# Patient Record
Sex: FEMALE | Race: BLACK OR AFRICAN AMERICAN | NOT HISPANIC OR LATINO | Employment: FULL TIME | URBAN - METROPOLITAN AREA
[De-identification: names, ages, dates, MRNs, and addresses within clinical notes are randomized per-mention and may not be internally consistent; named-entity substitution may affect disease eponyms.]

---

## 2023-08-31 LAB
CHLAMYDIA TRACH., AMPLIFIED: NEGATIVE
N. GONORRHEA, AMPLIFIED: NEGATIVE
TRICHOMONAS VAGINALIS: NEGATIVE

## 2024-01-30 ENCOUNTER — HOSPITAL ENCOUNTER (OUTPATIENT)
Dept: RADIOLOGY | Facility: CLINIC | Age: 44
Discharge: HOME | End: 2024-01-30
Payer: MEDICAID

## 2024-01-30 ENCOUNTER — OFFICE VISIT (OUTPATIENT)
Dept: PODIATRY | Facility: CLINIC | Age: 44
End: 2024-01-30
Payer: MEDICAID

## 2024-01-30 DIAGNOSIS — M79.672 PAIN IN BOTH FEET: ICD-10-CM

## 2024-01-30 DIAGNOSIS — M76.71 PERONEAL TENDINITIS OF BOTH LOWER LEGS: ICD-10-CM

## 2024-01-30 DIAGNOSIS — M79.671 PAIN IN BOTH FEET: ICD-10-CM

## 2024-01-30 DIAGNOSIS — M79.671 PAIN IN BOTH FEET: Primary | ICD-10-CM

## 2024-01-30 DIAGNOSIS — B35.1 ONYCHOMYCOSIS: ICD-10-CM

## 2024-01-30 DIAGNOSIS — M79.672 PAIN IN BOTH FEET: Primary | ICD-10-CM

## 2024-01-30 DIAGNOSIS — R26.89 ANTALGIC GAIT: ICD-10-CM

## 2024-01-30 DIAGNOSIS — M76.72 PERONEAL TENDINITIS OF BOTH LOWER LEGS: ICD-10-CM

## 2024-01-30 PROCEDURE — 11755 BIOPSY NAIL UNIT: CPT | Performed by: PODIATRIST

## 2024-01-30 PROCEDURE — 87102 FUNGUS ISOLATION CULTURE: CPT | Mod: OUT | Performed by: PODIATRIST

## 2024-01-30 PROCEDURE — 73630 X-RAY EXAM OF FOOT: CPT | Mod: 50

## 2024-01-30 PROCEDURE — 73630 X-RAY EXAM OF FOOT: CPT | Mod: BILATERAL PROCEDURE

## 2024-01-30 PROCEDURE — 99204 OFFICE O/P NEW MOD 45 MIN: CPT | Performed by: PODIATRIST

## 2024-01-30 PROCEDURE — 87206 SMEAR FLUORESCENT/ACID STAI: CPT

## 2024-01-30 PROCEDURE — 87102 FUNGUS ISOLATION CULTURE: CPT

## 2024-01-30 RX ORDER — ALBUTEROL SULFATE 90 UG/1
AEROSOL, METERED RESPIRATORY (INHALATION)
COMMUNITY

## 2024-01-30 RX ORDER — NORETHINDRONE ACETATE AND ETHINYL ESTRADIOL, AND FERROUS FUMARATE 1.5-30(21)
1 KIT ORAL DAILY
COMMUNITY
Start: 2023-02-26

## 2024-01-30 ASSESSMENT — ENCOUNTER SYMPTOMS
ENDOCRINE NEGATIVE: 1
PSYCHIATRIC NEGATIVE: 1
GASTROINTESTINAL NEGATIVE: 1
ROS SKIN COMMENTS: NAIL CHANGES
ALLERGIC/IMMUNOLOGIC NEGATIVE: 1
CONSTITUTIONAL NEGATIVE: 1
ARTHRALGIAS: 1
EYES NEGATIVE: 1
HEMATOLOGIC/LYMPHATIC NEGATIVE: 1
RESPIRATORY NEGATIVE: 1
CARDIOVASCULAR NEGATIVE: 1

## 2024-01-30 NOTE — PROGRESS NOTES
Chief Complaint   Patient presents with    Foot Pain     5th met area JULIO CÉSAR    New Patient is here today for pain and burning of the 5th met area JULIO CÉSAR. Left worse than right foot.     -No recent xrays. No injury or trama to the areas.  Wears crocs and tennis shoes hurt feet worse.  Taking OTC Motrin to help with pain.    Pain lateral feet > 6 months. Began on the right foot and then left foot. Points to the 5th met base. Sometimes motrin helps. No h/o trauma.  Although admits yesterday twisted the left foot on the sidewalk.  Has some initial burning discomfort that subsided.  But prior to that no known injuries.  Occasional post attic dyskinesia.   Not unsteady on feet.  Also thickened toenails to right foot.  And painful hyperkeratosis IP joint right great toe.    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       Review of Systems   Constitutional: Negative.    HENT: Negative.     Eyes: Negative.    Respiratory: Negative.     Cardiovascular: Negative.    Gastrointestinal: Negative.    Endocrine: Negative.    Genitourinary: Negative.    Musculoskeletal:  Positive for arthralgias and gait problem.   Skin:         Nail changes     Allergic/Immunologic: Negative.    Hematological: Negative.    Psychiatric/Behavioral: Negative.         General/Constitutional: Alert. NAD.   Respiratory: Non labored breathing.   Psychiatric: Mood and affect normal/baseline.   HEENT: Sclera clear. Wearing corrective lenses.  Dermatologic: Second digit nail right foot is hypertrophic.  Painful.  Other nails are normal.  Web spaces are dry. No ulcers no pre-ulcerative areas.  Mild hyperkeratosis IP joint right great toe.  Vascular: Pedal pulses are intact and symmetric including the dorsalis pedis and the posterior tibial pulses. Feet are warm to touch. No swelling appreciated either extremity.  Neurological: Alert and oriented. No acute distress. No sensory impairment bilateral.  Musculoskeletal: Motor function is grossly  intact and symmetric except for the left brevis there is pain with resistance.  Focal pain on palpation of the fifth metatarsal base left foot.  No acute swelling noted.  Distal insertional pain of the peroneal tendon.  Right foot minimal discomfort of the fifth metatarsal base.  Pes planus deformity.  Hypermobility noted.    Impression: Peroneal tendinitis left foot.  Bilateral foot pain.  Onychomycosis.  Hyperkeratosis.    -Today's treatment and course of therapy was discussed with the patient in detail. Patient's questions were answered. Proper foot care was discussed. This dictation was done using Dragon computer software and as such may contain grammatical errors.    -At this time recommend wearing supportive shoe would be ideal.  I would avoid crocs or slip on shoes as there is no much support at all.    -Will get x-rays of both feet.  Will discuss in follow-up    -Culture taken second digit nail plate right foot.  This will be back in 5 weeks.    -Superficial debridement of the IP joint hyperkeratosis.  You can use a pumice stone on your own as this will need to be repeated    -Smoking cessation discussed.     -Recommend new balance athletic shoe.  Appropriate prescription given for this.  Can consider making custom inserts.

## 2024-02-06 ENCOUNTER — LAB REQUISITION (OUTPATIENT)
Dept: LAB | Facility: HOSPITAL | Age: 44
End: 2024-02-06
Payer: MEDICAID

## 2024-02-06 DIAGNOSIS — B35.1 TINEA UNGUIUM: ICD-10-CM

## 2024-02-13 ENCOUNTER — APPOINTMENT (OUTPATIENT)
Dept: PODIATRY | Facility: CLINIC | Age: 44
End: 2024-02-13
Payer: MEDICAID

## 2024-03-05 LAB
CALCOFLUOR WHITE SPEC: NORMAL
FUNGUS SPEC CULT: NORMAL

## 2024-03-12 ENCOUNTER — OFFICE VISIT (OUTPATIENT)
Dept: PODIATRY | Facility: CLINIC | Age: 44
End: 2024-03-12
Payer: MEDICAID

## 2024-03-12 DIAGNOSIS — M79.672 PAIN IN BOTH FEET: ICD-10-CM

## 2024-03-12 DIAGNOSIS — B35.1 ONYCHOMYCOSIS: ICD-10-CM

## 2024-03-12 DIAGNOSIS — R26.89 ANTALGIC GAIT: ICD-10-CM

## 2024-03-12 DIAGNOSIS — M72.2 PLANTAR FASCIITIS: ICD-10-CM

## 2024-03-12 DIAGNOSIS — M76.72 PERONEAL TENDINITIS OF BOTH LOWER LEGS: Primary | ICD-10-CM

## 2024-03-12 DIAGNOSIS — M76.71 PERONEAL TENDINITIS OF BOTH LOWER LEGS: Primary | ICD-10-CM

## 2024-03-12 DIAGNOSIS — M79.671 PAIN IN BOTH FEET: ICD-10-CM

## 2024-03-12 PROCEDURE — 20605 DRAIN/INJ JOINT/BURSA W/O US: CPT | Performed by: PODIATRIST

## 2024-03-12 PROCEDURE — 99214 OFFICE O/P EST MOD 30 MIN: CPT | Performed by: PODIATRIST

## 2024-03-12 PROCEDURE — 1036F TOBACCO NON-USER: CPT | Performed by: PODIATRIST

## 2024-03-12 RX ORDER — METHYLPREDNISOLONE 4 MG/1
4 TABLET ORAL DAILY
Qty: 7 TABLET | Refills: 0 | Status: SHIPPED | OUTPATIENT
Start: 2024-03-12 | End: 2024-03-19

## 2024-03-12 RX ORDER — TRIAMCINOLONE ACETONIDE 40 MG/ML
40 INJECTION, SUSPENSION INTRA-ARTICULAR; INTRAMUSCULAR ONCE
Status: COMPLETED | OUTPATIENT
Start: 2024-03-12 | End: 2024-03-12

## 2024-03-12 RX ADMIN — TRIAMCINOLONE ACETONIDE 40 MG: 40 INJECTION, SUSPENSION INTRA-ARTICULAR; INTRAMUSCULAR at 11:19

## 2024-03-12 ASSESSMENT — ENCOUNTER SYMPTOMS
PSYCHIATRIC NEGATIVE: 1
EYES NEGATIVE: 1
CONSTITUTIONAL NEGATIVE: 1
CARDIOVASCULAR NEGATIVE: 1
ARTHRALGIAS: 1
ALLERGIC/IMMUNOLOGIC NEGATIVE: 1
ROS SKIN COMMENTS: NAIL CHANGES
GASTROINTESTINAL NEGATIVE: 1
ENDOCRINE NEGATIVE: 1
RESPIRATORY NEGATIVE: 1
HEMATOLOGIC/LYMPHATIC NEGATIVE: 1

## 2024-03-12 NOTE — PROGRESS NOTES
Chief Complaint   Patient presents with    Follow-up     Patient is here today for a follow up on nail culture and xray   Follow-up bilateral foot pain though primarily left foot.  Describes pain diffusely throughout the foot including plantar fascia and intermetatarsal spaces and lateral fifth metatarsal.  Duration about 6 months.  No trauma.  The shoes that she is wearing are quite old.  Continues with dystrophic second digit nail.  Had culture last visit.  And also painful hyperkeratosis IP joint left great toe.    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       Review of Systems   Constitutional: Negative.    HENT: Negative.     Eyes: Negative.    Respiratory: Negative.     Cardiovascular: Negative.    Gastrointestinal: Negative.    Endocrine: Negative.    Genitourinary: Negative.    Musculoskeletal:  Positive for arthralgias and gait problem.   Skin:         Nail changes     Allergic/Immunologic: Negative.    Hematological: Negative.    Psychiatric/Behavioral: Negative.         General/Constitutional: Alert. NAD.   Respiratory: Non labored breathing.   Psychiatric: Mood and affect normal/baseline.   HEENT: Sclera clear. Wearing corrective lenses.  Dermatologic: Painful second digit right foot nail plate.  Nail plate is dystrophic.  Nail culture no growth.  Web spaces are dry. No ulcers no pre-ulcerative areas.  Mild hyperkeratosis IP joint right great toe.  This is baseline.  Vascular: Pedal pulses are intact and symmetric including the dorsalis pedis and the posterior tibial pulses. Feet are warm to touch. No swelling appreciated either extremity.  Neurological: Alert and oriented. No acute distress. No sensory impairment bilateral.  Musculoskeletal: Still some diffuse pain on palpation throughout the left foot.  This includes the peroneal insertion area.  Intermetatarsal space especially #2.  Some plantar fascial pain as well.  Motor function is grossly intact and symmetric except for the  left brevis there is pain with resistance.  Focal pain on palpation of the fifth metatarsal base left foot.  No acute swelling noted.  Distal insertional pain of the peroneal tendon.  Right foot minimal discomfort of the fifth metatarsal base.  Pes planus deformity.   hypermobility noted.  Considerable genu valgum.    Impression: Peroneal tendinitis left foot.  Fasciitis.  Bilateral foot pain.  Onychomycosis dystrophic nail.  Hyperkeratosis.  Pes planus.    -Today's treatment and course of therapy was discussed with the patient in detail. Patient's questions were answered. Proper foot care was discussed. This dictation was done using Dragon computer software and as such may contain grammatical errors.    -Review x-rays in detail.    -Offering a sterile injection Kenalog 40 mg divided between the second and third metatarsal space of the left foot.  And the peroneal brevis insertion base of fifth metatarsal left foot.    -Debridement nail to right and IP joint hyperkeratosis.    -Discussed more supportive shoe gear.  Over-the-counter insert such as from a running shoe store or [employer] shoes.    -Can consider making custom orthotics.  Get prior authorization from insurance company.    -Will prescribe Medrol Dosepak see how you do postinjection if you need to take it later this week please get the prescription filled.    -Superficial debridement of the IP joint hyperkeratosis.  You can use a pumice stone on your own as this will need to be repeated    -Smoking cessation discussed.     -Physical therapy.

## 2024-04-16 ENCOUNTER — OFFICE VISIT (OUTPATIENT)
Dept: PODIATRY | Facility: CLINIC | Age: 44
End: 2024-04-16
Payer: MEDICAID

## 2024-04-16 DIAGNOSIS — M72.2 PLANTAR FASCIITIS: ICD-10-CM

## 2024-04-16 DIAGNOSIS — R26.89 ANTALGIC GAIT: ICD-10-CM

## 2024-04-16 DIAGNOSIS — M76.71 PERONEAL TENDINITIS OF BOTH LOWER LEGS: Primary | ICD-10-CM

## 2024-04-16 DIAGNOSIS — M76.72 PERONEAL TENDINITIS OF BOTH LOWER LEGS: Primary | ICD-10-CM

## 2024-04-16 DIAGNOSIS — B35.1 ONYCHOMYCOSIS: ICD-10-CM

## 2024-04-16 DIAGNOSIS — M79.671 PAIN IN BOTH FEET: ICD-10-CM

## 2024-04-16 DIAGNOSIS — M79.672 PAIN IN BOTH FEET: ICD-10-CM

## 2024-04-16 PROCEDURE — 99214 OFFICE O/P EST MOD 30 MIN: CPT | Performed by: PODIATRIST

## 2024-04-16 PROCEDURE — L4360 PNEUMAT WALKING BOOT PRE CST: HCPCS | Performed by: PODIATRIST

## 2024-04-16 PROCEDURE — 1036F TOBACCO NON-USER: CPT | Performed by: PODIATRIST

## 2024-04-16 RX ORDER — METHYLPREDNISOLONE 4 MG/1
TABLET ORAL
COMMUNITY
Start: 2024-03-22

## 2024-04-16 ASSESSMENT — ENCOUNTER SYMPTOMS
RESPIRATORY NEGATIVE: 1
ARTHRALGIAS: 1
PSYCHIATRIC NEGATIVE: 1
CONSTITUTIONAL NEGATIVE: 1
CARDIOVASCULAR NEGATIVE: 1
ROS SKIN COMMENTS: NAIL CHANGES
EYES NEGATIVE: 1
GASTROINTESTINAL NEGATIVE: 1
ENDOCRINE NEGATIVE: 1
ALLERGIC/IMMUNOLOGIC NEGATIVE: 1
HEMATOLOGIC/LYMPHATIC NEGATIVE: 1

## 2024-04-16 NOTE — PROGRESS NOTES
Chief Complaint   Patient presents with    Follow-up     Patient is here today for a follow up on left foot injection. Injection and Medrol dose pack not helpful        FU injection and medrol.  Good initial improvement.  However pain now is about the same now. Most pain lateral left foot and heel.  Forefoot pain is diminished.  IP joint hyperkeratotic area right great toe continues painful.  Second digit dystrophic and discomfort.  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       Review of Systems   Constitutional: Negative.    HENT: Negative.     Eyes: Negative.    Respiratory: Negative.     Cardiovascular: Negative.    Gastrointestinal: Negative.    Endocrine: Negative.    Genitourinary: Negative.    Musculoskeletal:  Positive for arthralgias and gait problem.   Skin:         Nail changes     Allergic/Immunologic: Negative.    Hematological: Negative.    Psychiatric/Behavioral: Negative.         General/Constitutional: Alert. NAD.   Respiratory: Non labored breathing.   Psychiatric: Mood and affect normal/baseline.   HEENT: Sclera clear. Wearing corrective lenses.  Dermatologic:  Painful second digit right foot nail plate.  Nail plate is dystrophic.  Nail culture no growth.  Web spaces are dry. No ulcers no pre-ulcerative areas.  Mild hyperkeratosis IP joint right great toe.  This is painful  Vascular: Pedal pulses are intact and symmetric including the dorsalis pedis and the posterior tibial pulses. Feet are warm to touch. No swelling appreciated either extremity.  Neurological: Alert and oriented. No acute distress. No sensory impairment bilateral.  Musculoskeletal: Continues with focal pain on palpation fifth metatarsal base peroneal insertion.  Pain with eversion of the foot.  Minimal soft tissue swelling noted.  Forefoot pain at this time is resolved.  Partial healing evident.  Considerable genu valgum.    Impression: Peroneal tendinitis left foot.  Fasciitis.  Bilateral foot pain.   Onychomycosis dystrophic nail.  Hyperkeratosis.  Pes planus.    -Today's treatment and course of therapy was discussed with the patient in detail. Patient's questions were answered. Proper foot care was discussed. This dictation was done using Dragon computer software and as such may contain grammatical errors.    -Still recommend more supportive shoe gear.  Over-the-counter insert such as from a running shoe store or [employer] shoes.    -Check with insurance regarding custom orthotic.  Will set up for casting appointment    -Dispensed cam boot.  Caution with driving and ambulating.    -Superficial debridement of the IP joint hyperkeratosis.  You can use a pumice stone on your own as this will need to be repeated    -Smoking cessation discussed.     -Rx Physical therapy.

## 2024-05-28 ENCOUNTER — APPOINTMENT (OUTPATIENT)
Dept: PODIATRY | Facility: CLINIC | Age: 44
End: 2024-05-28
Payer: MEDICAID

## 2024-06-24 ENCOUNTER — EVALUATION (OUTPATIENT)
Dept: PHYSICAL THERAPY | Facility: CLINIC | Age: 44
End: 2024-06-24
Payer: MEDICAID

## 2024-06-24 DIAGNOSIS — M76.71 PERONEAL TENDINITIS OF BOTH LOWER LEGS: ICD-10-CM

## 2024-06-24 DIAGNOSIS — M76.72 PERONEAL TENDINITIS OF BOTH LOWER LEGS: ICD-10-CM

## 2024-06-24 PROCEDURE — 97161 PT EVAL LOW COMPLEX 20 MIN: CPT | Mod: GP | Performed by: PHYSICAL THERAPIST

## 2024-06-24 PROCEDURE — 97110 THERAPEUTIC EXERCISES: CPT | Mod: GP | Performed by: PHYSICAL THERAPIST

## 2024-06-24 ASSESSMENT — ENCOUNTER SYMPTOMS
DEPRESSION: 0
OCCASIONAL FEELINGS OF UNSTEADINESS: 0
LOSS OF SENSATION IN FEET: 1

## 2024-06-24 NOTE — PROGRESS NOTES
Physical Therapy    Physical Therapy Evaluation and Treatment      Patient Name: Yessica Melgar  MRN: [id number]  Today's Date: 6/24/2024  Time Calculation  Start Time: 0150  Stop Time: 0230  Time Calculation (min): 40 minVisit: 1  Insurance: Reviewed  Physician: Chela Saravia  PT Evaluation Time Entry  PT Evaluation (Low) Time Entry: 30  PT Therapeutic Procedures Time Entry  Therapeutic Exercise Time Entry: 10    Assessment: Patient seen in PT for Initial Evaluation for foot and ankle pain secondary to PTT.   Patient presents with postural deviation  decreased ankle ROM , decreased ankle strength, foot and ankle  tenderness, steps one at a time going down.  Functionally, patient  unable to walk and stand prolonged secondary to pain.  Patient rates LEFS at 45%.    PT Assessment  Rehab Prognosis: Good  Evaluation/Treatment Tolerance: Patient tolerated treatment well     Plan:  Continue with POC  General fitness, shoe and orthotic wear, calf stretch, ankle strength, CKC, proprioception    OP PT Plan  PT Plan: Skilled PT  PT Frequency: 1 time per week  Duration: 6  Onset Date: 04/16/24  Rehab Potential: Good  Plan of Care Agreement: Patient    Current Problem:   1. Peroneal tendinitis of both lower legs  Referral to Physical Therapy    Follow Up In Physical Therapy          Subjective    General: Patient with a history of bilateral foot and ankle pain  for 8.  Onset was insidious - started in the right, now worse in left.  Patient treated with cortisone shot and boot - left foot and ankle.  Patient complains of pain in the region of the lateral foot -lateral ankle -bottom of heel, sharp pain, 7/10 at worst last 7 days.  Patient's pain is worse with standing and walking.  Works as a  - but has to do a lot of walking at times.   Functionally, patient is able to do all ADL's with pain.          Precautions: asthma, fall risk      Prior Level of Function: able to walk and stand prolonged       Objective      Posture: valgus and pronation  LE ROM:  bilateral ankle DF 0, PF 55, IN 35, EV 25  LE strength: left ankle 4/5 dorsiflexion, 4/5 plantarflexion, 4/5 IN, and 4/5 EV   Right ankle strength 4+/5  Gait: Trunk list to side with ipsilateral stance  Balance: sls 10 sec +  Tender to palpation at the lateral foot right, plantar fashia     Outcome Measures:  Other Measures  Lower Extremity Funtional Score (LEFS): 45%     Treatments:  Patient instructed in HEP including: ankle DF/PF and IN/EV ROM 20X, alphabet 1X, ankle DF with strap 10X and resisted ankle DF and IN with yellow theraband 15X (10 minutes)     EDUCATION: HEP       Goals:  Active       PT Problem       PT Goal 1       Start:  06/24/24    Expected End:  09/22/24       Ankle/foot pain 4/10 or less         PT Goal 2       Start:  06/24/24    Expected End:  09/22/24       Improve LEFS by 20%         PT Goal 3       Start:  06/24/24    Expected End:  09/22/24       DF ROM to 10         PT Goal 4       Start:  06/24/24    Expected End:  09/22/24       Ankle strength 5/5

## 2024-06-25 ENCOUNTER — APPOINTMENT (OUTPATIENT)
Dept: PODIATRY | Facility: CLINIC | Age: 44
End: 2024-06-25
Payer: MEDICAID

## 2024-07-12 DIAGNOSIS — J45.909 UNCOMPLICATED ASTHMA, UNSPECIFIED ASTHMA SEVERITY, UNSPECIFIED WHETHER PERSISTENT (HHS-HCC): Primary | ICD-10-CM

## 2024-07-12 DIAGNOSIS — Z30.41 ENCOUNTER FOR SURVEILLANCE OF CONTRACEPTIVE PILLS: ICD-10-CM

## 2024-07-12 RX ORDER — NORETHINDRONE ACETATE AND ETHINYL ESTRADIOL, AND FERROUS FUMARATE 1.5-30(21)
1 KIT ORAL DAILY
Qty: 90 TABLET | Refills: 3 | Status: SHIPPED | OUTPATIENT
Start: 2024-07-12

## 2024-07-12 RX ORDER — ALBUTEROL SULFATE 90 UG/1
2 AEROSOL, METERED RESPIRATORY (INHALATION)
Qty: 18 G | Refills: 2 | Status: SHIPPED | OUTPATIENT
Start: 2024-07-12

## 2024-07-23 ENCOUNTER — TREATMENT (OUTPATIENT)
Dept: PHYSICAL THERAPY | Facility: CLINIC | Age: 44
End: 2024-07-23
Payer: MEDICAID

## 2024-07-23 DIAGNOSIS — M76.71 PERONEAL TENDINITIS OF BOTH LOWER LEGS: ICD-10-CM

## 2024-07-23 DIAGNOSIS — M76.72 PERONEAL TENDINITIS OF BOTH LOWER LEGS: ICD-10-CM

## 2024-07-23 PROCEDURE — 97110 THERAPEUTIC EXERCISES: CPT | Mod: GP | Performed by: PHYSICAL THERAPIST

## 2024-07-23 NOTE — PROGRESS NOTES
Physical Therapy    Physical Therapy Treatment    Patient Name: Yessica Melgar  MRN: [id number]  Today's Date: 7/23/2024  Visit: 2/6  Insurance: [facility]  Authorization: 6/24/24-9/22/24       Time Entry:   Time Calculation  Start Time: 1100  Stop Time: 1140  Time Calculation (min): 40 min     PT Therapeutic Procedures Time Entry  Therapeutic Exercise Time Entry: 40                   Assessment: Good understanding and compliance with HEP.      Plan:  Continue with POC       Current Problem  1. Peroneal tendinitis of both lower legs  Follow Up In Physical Therapy          General      Foot pain yesterday -   Worse after sit to stand after prolonged sitting  Pain to 10/10 at worst last 2 days      Subjective              Objective     Independent with HEP    Treatments:  SciFit stepper 7 minutes  Step stretch 20X  SLS 2 sec each 10X  Heel raises 20X  Practiced heel to toe walking 20' X 4  Reviewed HEP  Mobilization of foot and ankle all planes 10 minutes  (40 minutes)      OP EDUCATION: HEP       Goals:  Active       PT Problem       PT Goal 1       Start:  06/24/24    Expected End:  09/22/24       Ankle/foot pain 4/10 or less         PT Goal 2       Start:  06/24/24    Expected End:  09/22/24       Improve LEFS by 20%         PT Goal 3       Start:  06/24/24    Expected End:  09/22/24       DF ROM to 10         PT Goal 4       Start:  06/24/24    Expected End:  09/22/24       Ankle strength 5/5

## 2024-07-30 ENCOUNTER — TREATMENT (OUTPATIENT)
Dept: PHYSICAL THERAPY | Facility: CLINIC | Age: 44
End: 2024-07-30
Payer: MEDICAID

## 2024-07-30 DIAGNOSIS — M76.72 PERONEAL TENDINITIS OF BOTH LOWER LEGS: ICD-10-CM

## 2024-07-30 DIAGNOSIS — M76.71 PERONEAL TENDINITIS OF BOTH LOWER LEGS: ICD-10-CM

## 2024-07-30 PROCEDURE — 97110 THERAPEUTIC EXERCISES: CPT | Mod: GP,CQ

## 2024-07-30 ASSESSMENT — PAIN - FUNCTIONAL ASSESSMENT: PAIN_FUNCTIONAL_ASSESSMENT: 0-10

## 2024-07-30 ASSESSMENT — PAIN SCALES - GENERAL: PAINLEVEL_OUTOF10: 3

## 2024-07-30 NOTE — PROGRESS NOTES
Physical Therapy    Physical Therapy Treatment    Patient Name: Yessica Melgar  MRN: [id number]  Today's Date: 7/30/2024  Visit: 3/6  Insurance: [facility]  Authorization: 6/24/24-9/22/24   Time Entry:   Time Calculation  Start Time: 1030  Stop Time: 1115  Time Calculation (min): 45 min     PT Therapeutic Procedures Time Entry  Therapeutic Exercise Time Entry: 45                   Assessment:   PT Assessment  Evaluation/Treatment Tolerance: Patient tolerated treatment well  Treatment session received well. Addition of resisted ankle strengthening, moderate muscular shaking present bilaterally through both inversion and eversion; review of exercises for HEP. Expressed tolerable discomfort 2nd to intensity of stretching with added calf/soleus stretch. Finishes session feeling well at this time.     Plan:  Continue with POC     Current Problem  1. Peroneal tendinitis of both lower legs  Follow Up In Physical Therapy        General        Subjective    Pain is not that bad right now , 3/10, it was feeling good and pain free this morning until I put my shoes on  Pain is intermittent through the week, will perform my exercises and reduce the current symptoms  HEP completed at least twice a day    Pain Assessment  Pain Assessment: 0-10  0-10 (Numeric) Pain Score: 3    Objective   Independent with HEP    Treatments:  SciFit bike x 7 minutes  Step stretch 20X  Calf/soleus stretch on wedge 2 x 20 sec each  Seated self inv/supination peroneal stretch 2 x 20 sec  GTB resisted Inv/ever/df x 20 each  SLS 5 sec each 10X  Heel toe walking x 3 in // bars  Heel raises 20X no UE support  Foam standing mini squat x 10 (discharge to other balance activities, knee pain)    NT  Mobilization of foot and ankle all planes 10 minutes    (45 minutes)    OP EDUCATION: HEP     Goals:  Active       PT Problem       PT Goal 1       Start:  06/24/24    Expected End:  09/22/24       Ankle/foot pain 4/10 or less         PT Goal 2       Start:  06/24/24     Expected End:  09/22/24       Improve LEFS by 20%         PT Goal 3       Start:  06/24/24    Expected End:  09/22/24       DF ROM to 10         PT Goal 4       Start:  06/24/24    Expected End:  09/22/24       Ankle strength 5/5

## 2024-08-06 ENCOUNTER — APPOINTMENT (OUTPATIENT)
Dept: PHYSICAL THERAPY | Facility: CLINIC | Age: 44
End: 2024-08-06
Payer: MEDICAID

## 2024-08-13 ENCOUNTER — TREATMENT (OUTPATIENT)
Dept: PHYSICAL THERAPY | Facility: CLINIC | Age: 44
End: 2024-08-13
Payer: MEDICAID

## 2024-08-13 DIAGNOSIS — M76.71 PERONEAL TENDINITIS OF BOTH LOWER LEGS: ICD-10-CM

## 2024-08-13 DIAGNOSIS — M76.72 PERONEAL TENDINITIS OF BOTH LOWER LEGS: ICD-10-CM

## 2024-08-13 PROCEDURE — 97110 THERAPEUTIC EXERCISES: CPT | Mod: GP | Performed by: PHYSICAL THERAPIST

## 2024-08-13 PROCEDURE — 97140 MANUAL THERAPY 1/> REGIONS: CPT | Mod: GP | Performed by: PHYSICAL THERAPIST

## 2024-08-13 NOTE — PROGRESS NOTES
Physical Therapy    Physical Therapy Treatment    Patient Name: Yessica Melgar  MRN: [id number]  Today's Date: 8/13/2024  Visit: 4/6  Insurance: [facility]  Authorization: 6/24/24-9/22/24   Time Entry:   Time Calculation  Start Time: 1100  Stop Time: 1145  Time Calculation (min): 45 min     PT Therapeutic Procedures Time Entry  Manual Therapy Time Entry: 10  Therapeutic Exercise Time Entry: 30  PT Modalities Time Entry  Hot/Cold Pack Time Entry: 5                Assessment: Patient with increased ankle pain last 2 days.  Patient advised to use ice, OTC meds and rest when painful.  Less pain after ice.       Plan:  Continue with POC     Current Problem  1. Peroneal tendinitis of both lower legs  Follow Up In Physical Therapy        General        Subjective   7/10 - pain when she wakes in the am - worse last 2 days  No aggravating factor            Objective       Treatments:  SciFit bike x 8 minutes  Step stretch 20X  Heel  raises 20X   Mini squats 20X  Practiced heel to toe walking 20' X 2  Seated self inv/supination peroneal stretch 2 x 20 sec  GTB resisted Inv/ever/df x 20 each  (30 minutes)  SLS 5 sec each 10X - not today    Mobilization of foot and ankle all planes 10 minutes    CP to the left ankle 5 minutes    (45 minutes)    OP EDUCATION: HEP     Goals:  Active       PT Problem       PT Goal 1       Start:  06/24/24    Expected End:  09/22/24       Ankle/foot pain 4/10 or less         PT Goal 2       Start:  06/24/24    Expected End:  09/22/24       Improve LEFS by 20%         PT Goal 3       Start:  06/24/24    Expected End:  09/22/24       DF ROM to 10         PT Goal 4       Start:  06/24/24    Expected End:  09/22/24       Ankle strength 5/5

## 2024-08-20 ENCOUNTER — APPOINTMENT (OUTPATIENT)
Dept: PHYSICAL THERAPY | Facility: CLINIC | Age: 44
End: 2024-08-20
Payer: MEDICAID

## 2024-09-03 ENCOUNTER — APPOINTMENT (OUTPATIENT)
Dept: OBSTETRICS AND GYNECOLOGY | Facility: CLINIC | Age: 44
End: 2024-09-03
Payer: MEDICAID

## 2024-09-03 ENCOUNTER — LAB (OUTPATIENT)
Dept: LAB | Facility: LAB | Age: 44
End: 2024-09-03
Payer: MEDICAID

## 2024-09-03 VITALS
WEIGHT: 275 LBS | DIASTOLIC BLOOD PRESSURE: 93 MMHG | SYSTOLIC BLOOD PRESSURE: 145 MMHG | HEIGHT: 62 IN | BODY MASS INDEX: 50.61 KG/M2

## 2024-09-03 DIAGNOSIS — Z11.3 SCREENING EXAMINATION FOR STI: ICD-10-CM

## 2024-09-03 DIAGNOSIS — Z01.419 ENCOUNTER FOR GYNECOLOGICAL EXAMINATION WITHOUT ABNORMAL FINDING: Primary | ICD-10-CM

## 2024-09-03 DIAGNOSIS — J45.909 UNCOMPLICATED ASTHMA, UNSPECIFIED ASTHMA SEVERITY, UNSPECIFIED WHETHER PERSISTENT (HHS-HCC): ICD-10-CM

## 2024-09-03 DIAGNOSIS — Z30.41 ENCOUNTER FOR SURVEILLANCE OF CONTRACEPTIVE PILLS: ICD-10-CM

## 2024-09-03 DIAGNOSIS — N94.6 DYSMENORRHEA: ICD-10-CM

## 2024-09-03 LAB
HCV AB SER QL: NONREACTIVE
HIV 1+2 AB+HIV1 P24 AG SERPL QL IA: NONREACTIVE
TREPONEMA PALLIDUM IGG+IGM AB [PRESENCE] IN SERUM OR PLASMA BY IMMUNOASSAY: NONREACTIVE

## 2024-09-03 PROCEDURE — 87591 N.GONORRHOEAE DNA AMP PROB: CPT

## 2024-09-03 PROCEDURE — 87491 CHLMYD TRACH DNA AMP PROBE: CPT

## 2024-09-03 PROCEDURE — 3008F BODY MASS INDEX DOCD: CPT | Performed by: OBSTETRICS & GYNECOLOGY

## 2024-09-03 PROCEDURE — 87661 TRICHOMONAS VAGINALIS AMPLIF: CPT

## 2024-09-03 PROCEDURE — 86803 HEPATITIS C AB TEST: CPT

## 2024-09-03 PROCEDURE — 86780 TREPONEMA PALLIDUM: CPT

## 2024-09-03 PROCEDURE — 99396 PREV VISIT EST AGE 40-64: CPT | Performed by: OBSTETRICS & GYNECOLOGY

## 2024-09-03 PROCEDURE — 36415 COLL VENOUS BLD VENIPUNCTURE: CPT

## 2024-09-03 PROCEDURE — 87389 HIV-1 AG W/HIV-1&-2 AB AG IA: CPT

## 2024-09-03 RX ORDER — ALBUTEROL SULFATE 90 UG/1
2 INHALANT RESPIRATORY (INHALATION)
Qty: 18 G | Refills: 2 | Status: SHIPPED | OUTPATIENT
Start: 2024-09-03

## 2024-09-03 RX ORDER — IBUPROFEN 800 MG/1
800 TABLET ORAL EVERY 8 HOURS PRN
Qty: 60 TABLET | Refills: 3 | Status: SHIPPED | OUTPATIENT
Start: 2024-09-03

## 2024-09-03 RX ORDER — NORETHINDRONE ACETATE AND ETHINYL ESTRADIOL, AND FERROUS FUMARATE 1.5-30(21)
1 KIT ORAL DAILY
Qty: 90 TABLET | Refills: 3 | Status: SHIPPED | OUTPATIENT
Start: 2024-09-03

## 2024-09-03 RX ORDER — NORETHINDRONE ACETATE AND ETHINYL ESTRADIOL 1.5; .03 MG/1; MG/1
1 TABLET ORAL DAILY
COMMUNITY
Start: 2024-04-20 | End: 2024-09-03 | Stop reason: WASHOUT

## 2024-09-03 RX ORDER — IBUPROFEN 800 MG/1
1 TABLET ORAL EVERY 8 HOURS PRN
COMMUNITY
Start: 2024-07-16 | End: 2024-09-03 | Stop reason: SDUPTHER

## 2024-09-03 NOTE — PROGRESS NOTES
Subjective   Yessica Melgar is a 43 y.o. female here for a routine exam.  She is on birth control pills.  Her cycles are typically regular and light.  She may occasionally cramping for 1 day.    No dysuria, no change in bowel habits or vaginal discharge.    We discussed that her oldest daughter is 21 years old, and her youngest son is now in first grade.    Personal health questionnaire reviewed: yes.     Gynecologic History  Patient's last menstrual period was 2024 (approximate).  Contraception: OCP (estrogen/progesterone)  Last Pap: 23. Results were: normal  Last mammogram: 22. Results were: normal    Obstetric History  OB History    Para Term  AB Living   2 2           SAB IAB Ectopic Multiple Live Births                  # Outcome Date GA Lbr William/2nd Weight Sex Type Anes PTL Lv   2 Para            1 Para                Objective   Constitutional: Alert and in no acute distress. Well developed, well nourished.   Head and Face: Head and face: Normal.    Eyes: Normal external exam - nonicteric sclera, extraocular movements intact (EOMI) and no ptosis.   Neck: No neck asymmetry. Supple. Thyroid not enlarged and there were no palpable thyroid nodules.    Pulmonary: No respiratory distress.   Chest: Breasts: Normal appearance, no nipple discharge and no skin changes. Palpation of breasts and axillae: No palpable mass and no axillary lymphadenopathy.   Abdomen: Soft nontender; no abdominal mass palpated. No organomegaly. No hernias.   Genitourinary: External genitalia: Normal. No inguinal lymphadenopathy. Bartholin's Urethral and Skenes Glands: Normal. Urethra: Normal.  Bladder: Normal on palpation. Vagina: Normal. Cervix: Normal.  Uterus: Normal.  Right Adnexa/parametria: Normal.  Left Adnexa/parametria: Normal.  Inspection of Perianal Area: Normal.   Musculoskeletal: No joint swelling seen, normal movements of all extremities.   Skin: Normal skin color and pigmentation, normal skin  turgor, and no rash.   Neurologic: Non-focal. Grossly intact.   Psychiatric: Alert and oriented x 3. Affect normal to patient baseline. Mood: Appropriate.  Physical Exam     Assessment/Plan   Healthy female exam.  This is a 43-year-old female with a normal exam.  No Pap smear was sent, she is high risk HPV negative in 2023.    Cultures were sent for chlamydia, gonorrhea and trichomonas.  We discussed also checking HIV, hepatitis C and syphilis.    She requests a refill of the Junel 1.5/30 d.a.w.  She does request a refill of her albuterol inhaler and ibuprofen.    Her routine mammogram was ordered with tomosynthesis.    I will see her in 1 year.  Mammogram ordered.

## 2024-09-04 LAB
C TRACH RRNA SPEC QL NAA+PROBE: NEGATIVE
N GONORRHOEA DNA SPEC QL PROBE+SIG AMP: NEGATIVE
T VAGINALIS RRNA SPEC QL NAA+PROBE: NEGATIVE

## 2024-09-10 ENCOUNTER — APPOINTMENT (OUTPATIENT)
Dept: PHYSICAL THERAPY | Facility: CLINIC | Age: 44
End: 2024-09-10
Payer: MEDICAID

## 2024-09-17 ENCOUNTER — TREATMENT (OUTPATIENT)
Dept: PHYSICAL THERAPY | Facility: CLINIC | Age: 44
End: 2024-09-17
Payer: MEDICAID

## 2024-09-17 DIAGNOSIS — M76.71 PERONEAL TENDINITIS OF BOTH LOWER LEGS: ICD-10-CM

## 2024-09-17 DIAGNOSIS — M76.72 PERONEAL TENDINITIS OF BOTH LOWER LEGS: ICD-10-CM

## 2024-09-17 PROCEDURE — 97110 THERAPEUTIC EXERCISES: CPT | Mod: GP | Performed by: PHYSICAL THERAPIST

## 2024-09-17 NOTE — PROGRESS NOTES
Physical Therapy    Physical Therapy Treatment    Patient Name: Yessica Melgar  MRN: [id number]  Today's Date: 9/17/2024  Visit: 5/6  Insurance: [facility]  Authorization: 6/24/24-9/22/24   Time Entry:   Time Calculation  Start Time: 1055  Stop Time: 1145  Time Calculation (min): 50 min     PT Therapeutic Procedures Time Entry  Therapeutic Exercise Time Entry: 40  PT Modalities Time Entry  Hot/Cold Pack Time Entry: 10                Assessment:  Decreased pain since last visit.  Able to relieve pain with CP.  Still continues to have high pain levels after being up on      Plan:  Continue with POC  Reassess next visit      Current Problem  1. Peroneal tendinitis of both lower legs  Follow Up In Physical Therapy          General        Subjective   10/10 - pain when she wakes in the am - worse last 2 days  Pain bad on Sunday - worse with standing and walking    Objective     Tender along border of 5th metatarsal on left foot    Treatments:  SciFit bike x 5 minutes  Step stretch 20X  Heel  raises 20X   Mini squats 20X  Practiced heel to toe walking 20' X 2  Wobble board DF/PF and IN/EV 30X each  Seated self inv/supination peroneal stretch with strap  20X  GTB resisted Inv/ever/df x 20 each  (40 minutes)      CP to the left ankle 10 minutes    (45 minutes)    OP EDUCATION: HEP     Goals:  Active       PT Problem       PT Goal 1       Start:  06/24/24    Expected End:  09/22/24       Ankle/foot pain 4/10 or less         PT Goal 2       Start:  06/24/24    Expected End:  09/22/24       Improve LEFS by 20%         PT Goal 3       Start:  06/24/24    Expected End:  09/22/24       DF ROM to 10         PT Goal 4       Start:  06/24/24    Expected End:  09/22/24       Ankle strength 5/5

## 2024-09-26 ENCOUNTER — APPOINTMENT (OUTPATIENT)
Dept: PRIMARY CARE | Facility: CLINIC | Age: 44
End: 2024-09-26
Payer: MEDICAID

## 2024-09-26 VITALS
BODY MASS INDEX: 49.38 KG/M2 | WEIGHT: 270 LBS | DIASTOLIC BLOOD PRESSURE: 86 MMHG | HEART RATE: 80 BPM | SYSTOLIC BLOOD PRESSURE: 128 MMHG | TEMPERATURE: 96.8 F

## 2024-09-26 DIAGNOSIS — Z12.11 COLON CANCER SCREENING: ICD-10-CM

## 2024-09-26 DIAGNOSIS — Z00.00 HEALTH CARE MAINTENANCE: Primary | ICD-10-CM

## 2024-09-26 DIAGNOSIS — G89.29 CHRONIC PAIN IN LEFT FOOT: ICD-10-CM

## 2024-09-26 DIAGNOSIS — Z13.1 DIABETES MELLITUS SCREENING: ICD-10-CM

## 2024-09-26 DIAGNOSIS — M79.672 CHRONIC PAIN IN LEFT FOOT: ICD-10-CM

## 2024-09-26 DIAGNOSIS — Z12.31 VISIT FOR SCREENING MAMMOGRAM: ICD-10-CM

## 2024-09-26 DIAGNOSIS — Z13.220 SCREENING CHOLESTEROL LEVEL: ICD-10-CM

## 2024-09-26 DIAGNOSIS — Z23 IMMUNIZATION DUE: ICD-10-CM

## 2024-09-26 PROCEDURE — 1036F TOBACCO NON-USER: CPT | Performed by: FAMILY MEDICINE

## 2024-09-26 PROCEDURE — 99386 PREV VISIT NEW AGE 40-64: CPT | Performed by: FAMILY MEDICINE

## 2024-09-26 RX ORDER — NAPROXEN 500 MG/1
500 TABLET ORAL 2 TIMES DAILY PRN
Qty: 60 TABLET | Refills: 5 | Status: SHIPPED | OUTPATIENT
Start: 2024-09-26 | End: 2025-05-24

## 2024-09-26 ASSESSMENT — PAIN SCALES - GENERAL: PAINLEVEL: 8

## 2024-09-26 NOTE — ASSESSMENT & PLAN NOTE
-CT scan of the foot ordered.   -Follow-up visit with podiatrist as scheduled.    -Naproxen 500 mg BID for 2 weeks then PRN.

## 2024-09-26 NOTE — ASSESSMENT & PLAN NOTE
-mammogram 9/6/2022.  Ordered.   -colonoscopy never- ordered.   -blood test last in 2018.  Ordered.   -pap smear 8/29/2023.   -eye exam was several years ago.  Recommended.   -immunizations all recommended.  To get at her local pharmacy.

## 2024-09-26 NOTE — PROGRESS NOTES
Subjective   Patient ID: Yessica Melgar is a 43 y.o. female who presents for Annual Exam (CPE  left foot ).    HPI    The patient is a 42 yo AA female with a history of obesity ( BMI: 49.3), here for:    1- CPE.  -mammogram 9/6/2022.  Ordered.   -colonoscopy never- ordered.   -blood test last in 2018.  Ordered.   -pap smear 8/29/2023.   -eye exam was several years ago.  Recommended.   -immunizations all recommended.  To get at her local pharmacy.      2- Left lateral foot dorsum pain.  Under the care of a podiatrist,.  It started over 6 months ago and is not getting better despite NSAIDS, PT and a boot.    A review of system was completed.  All systems were reviewed and were normal, except for the ones that are listed in the HPI.    Objective   Physical Exam  Constitutional:       Appearance: Normal appearance.   HENT:      Head: Normocephalic and atraumatic.      Right Ear: Tympanic membrane, ear canal and external ear normal.      Left Ear: Tympanic membrane, ear canal and external ear normal.      Nose: Nose normal.      Mouth/Throat:      Mouth: Mucous membranes are moist.      Pharynx: Oropharynx is clear.   Eyes:      Extraocular Movements: Extraocular movements intact.      Conjunctiva/sclera: Conjunctivae normal.      Pupils: Pupils are equal, round, and reactive to light.   Cardiovascular:      Rate and Rhythm: Normal rate and regular rhythm.      Pulses: Normal pulses.   Pulmonary:      Effort: Pulmonary effort is normal.      Breath sounds: Normal breath sounds.   Abdominal:      General: Abdomen is flat. Bowel sounds are normal.      Palpations: Abdomen is soft.   Musculoskeletal:         General: Normal range of motion.      Cervical back: Normal range of motion and neck supple.   Skin:     General: Skin is warm.   Neurological:      General: No focal deficit present.      Mental Status: She is alert and oriented to person, place, and time. Mental status is at baseline.   Psychiatric:         Mood  and Affect: Mood normal.         Behavior: Behavior normal.         Thought Content: Thought content normal.         Judgment: Judgment normal.     Assessment/Plan   Problem List Items Addressed This Visit       Health care maintenance - Primary     -mammogram 9/6/2022.  Ordered.   -colonoscopy never- ordered.   -blood test last in 2018.  Ordered.   -pap smear 8/29/2023.   -eye exam was several years ago.  Recommended.   -immunizations all recommended.  To get at her local pharmacy.          Relevant Orders    CT foot left wo IV contrast    Hemoglobin A1C    Lipid Panel    Comprehensive Metabolic Panel    CBC    TSH with reflex to Free T4 if abnormal    BI mammo bilateral screening tomosynthesis    Colonoscopy Screening; Average Risk Patient    Chronic pain in left foot     -CT scan of the foot ordered.   -Follow-up visit with podiatrist as scheduled.    -Naproxen 500 mg BID for 2 weeks then PRN.          Relevant Medications    naproxen (Naprosyn) 500 mg tablet    Other Relevant Orders    CT foot left wo IV contrast    Screening cholesterol level    Relevant Orders    Lipid Panel    Diabetes mellitus screening    Relevant Orders    Hemoglobin A1C    Visit for screening mammogram    Relevant Orders    BI mammo bilateral screening tomosynthesis    Colon cancer screening    Relevant Orders    Colonoscopy Screening; Average Risk Patient    Immunization due    Patient to return to office in 1 year for your next CPE.

## 2024-10-01 ENCOUNTER — LAB (OUTPATIENT)
Dept: LAB | Facility: LAB | Age: 44
End: 2024-10-01
Payer: MEDICAID

## 2024-10-01 DIAGNOSIS — Z13.1 DIABETES MELLITUS SCREENING: ICD-10-CM

## 2024-10-01 DIAGNOSIS — Z13.220 SCREENING CHOLESTEROL LEVEL: ICD-10-CM

## 2024-10-01 DIAGNOSIS — Z00.00 HEALTH CARE MAINTENANCE: ICD-10-CM

## 2024-10-01 LAB
ALBUMIN SERPL BCP-MCNC: 3.8 G/DL (ref 3.4–5)
ALP SERPL-CCNC: 73 U/L (ref 33–110)
ALT SERPL W P-5'-P-CCNC: 6 U/L (ref 7–45)
ANION GAP SERPL CALC-SCNC: 12 MMOL/L (ref 10–20)
AST SERPL W P-5'-P-CCNC: 10 U/L (ref 9–39)
BILIRUB SERPL-MCNC: 0.7 MG/DL (ref 0–1.2)
BUN SERPL-MCNC: 8 MG/DL (ref 6–23)
CALCIUM SERPL-MCNC: 8.9 MG/DL (ref 8.6–10.6)
CHLORIDE SERPL-SCNC: 107 MMOL/L (ref 98–107)
CHOLEST SERPL-MCNC: 136 MG/DL (ref 0–199)
CHOLESTEROL/HDL RATIO: 2.8
CO2 SERPL-SCNC: 25 MMOL/L (ref 21–32)
CREAT SERPL-MCNC: 0.68 MG/DL (ref 0.5–1.05)
EGFRCR SERPLBLD CKD-EPI 2021: >90 ML/MIN/1.73M*2
ERYTHROCYTE [DISTWIDTH] IN BLOOD BY AUTOMATED COUNT: 13.5 % (ref 11.5–14.5)
EST. AVERAGE GLUCOSE BLD GHB EST-MCNC: 120 MG/DL
GLUCOSE SERPL-MCNC: 123 MG/DL (ref 74–99)
HBA1C MFR BLD: 5.8 %
HCT VFR BLD AUTO: 38.4 % (ref 36–46)
HDLC SERPL-MCNC: 48.6 MG/DL
HGB BLD-MCNC: 12 G/DL (ref 12–16)
LDLC SERPL CALC-MCNC: 61 MG/DL
MCH RBC QN AUTO: 30 PG (ref 26–34)
MCHC RBC AUTO-ENTMCNC: 31.3 G/DL (ref 32–36)
MCV RBC AUTO: 96 FL (ref 80–100)
NON HDL CHOLESTEROL: 87 MG/DL (ref 0–149)
NRBC BLD-RTO: 0 /100 WBCS (ref 0–0)
PLATELET # BLD AUTO: 303 X10*3/UL (ref 150–450)
POTASSIUM SERPL-SCNC: 4.3 MMOL/L (ref 3.5–5.3)
PROT SERPL-MCNC: 6.9 G/DL (ref 6.4–8.2)
RBC # BLD AUTO: 4 X10*6/UL (ref 4–5.2)
SODIUM SERPL-SCNC: 140 MMOL/L (ref 136–145)
TRIGL SERPL-MCNC: 132 MG/DL (ref 0–149)
TSH SERPL-ACNC: 1.15 MIU/L (ref 0.44–3.98)
VLDL: 26 MG/DL (ref 0–40)
WBC # BLD AUTO: 5.7 X10*3/UL (ref 4.4–11.3)

## 2024-10-01 PROCEDURE — 80053 COMPREHEN METABOLIC PANEL: CPT

## 2024-10-01 PROCEDURE — 84443 ASSAY THYROID STIM HORMONE: CPT

## 2024-10-01 PROCEDURE — 83036 HEMOGLOBIN GLYCOSYLATED A1C: CPT

## 2024-10-01 PROCEDURE — 36415 COLL VENOUS BLD VENIPUNCTURE: CPT

## 2024-10-01 PROCEDURE — 80061 LIPID PANEL: CPT

## 2024-10-01 PROCEDURE — 85027 COMPLETE CBC AUTOMATED: CPT

## 2024-10-02 NOTE — RESULT ENCOUNTER NOTE
Patient's blood test showed signs of a slightly elevated blood sugar in the prediabetic range.  Can lower sugar/low carbohydrate diet, increasing exercise is recommended.  The rest of the blood test

## 2024-10-03 ENCOUNTER — TELEPHONE (OUTPATIENT)
Dept: PRIMARY CARE | Facility: CLINIC | Age: 44
End: 2024-10-03
Payer: MEDICAID

## 2024-10-15 ENCOUNTER — HOSPITAL ENCOUNTER (OUTPATIENT)
Dept: RADIOLOGY | Facility: CLINIC | Age: 44
Discharge: HOME | End: 2024-10-15
Payer: MEDICAID

## 2024-10-15 VITALS — WEIGHT: 268.96 LBS | HEIGHT: 62 IN | BODY MASS INDEX: 49.49 KG/M2

## 2024-10-15 DIAGNOSIS — Z12.31 VISIT FOR SCREENING MAMMOGRAM: ICD-10-CM

## 2024-10-15 DIAGNOSIS — Z00.00 HEALTH CARE MAINTENANCE: ICD-10-CM

## 2024-10-15 PROCEDURE — 77063 BREAST TOMOSYNTHESIS BI: CPT | Performed by: RADIOLOGY

## 2024-10-15 PROCEDURE — 77067 SCR MAMMO BI INCL CAD: CPT

## 2024-10-15 PROCEDURE — 77067 SCR MAMMO BI INCL CAD: CPT | Performed by: RADIOLOGY

## 2024-10-22 ENCOUNTER — HOSPITAL ENCOUNTER (OUTPATIENT)
Dept: RADIOLOGY | Facility: HOSPITAL | Age: 44
Discharge: HOME | End: 2024-10-22
Payer: MEDICAID

## 2024-10-22 DIAGNOSIS — Z00.00 HEALTH CARE MAINTENANCE: ICD-10-CM

## 2024-10-22 DIAGNOSIS — G89.29 CHRONIC PAIN IN LEFT FOOT: ICD-10-CM

## 2024-10-22 DIAGNOSIS — M79.672 CHRONIC PAIN IN LEFT FOOT: ICD-10-CM

## 2024-10-22 PROCEDURE — 73700 CT LOWER EXTREMITY W/O DYE: CPT | Mod: LT

## 2024-10-22 PROCEDURE — 73700 CT LOWER EXTREMITY W/O DYE: CPT | Mod: LEFT SIDE | Performed by: RADIOLOGY

## 2024-10-28 ENCOUNTER — TELEPHONE (OUTPATIENT)
Dept: PRIMARY CARE | Facility: CLINIC | Age: 44
End: 2024-10-28
Payer: MEDICAID

## 2024-11-11 ENCOUNTER — TELEPHONE (OUTPATIENT)
Dept: OBSTETRICS AND GYNECOLOGY | Facility: CLINIC | Age: 44
End: 2024-11-11
Payer: MEDICAID

## 2024-11-11 DIAGNOSIS — N89.8 VAGINAL ITCHING: Primary | ICD-10-CM

## 2024-11-11 RX ORDER — FLUCONAZOLE 150 MG/1
150 TABLET ORAL ONCE
Qty: 2 TABLET | Refills: 2 | Status: SHIPPED | OUTPATIENT
Start: 2024-11-11 | End: 2024-11-11

## 2024-11-11 NOTE — TELEPHONE ENCOUNTER
Pt is having symptoms of a yeast infection would like something called in please advise thank you  Antonia Barber MA

## 2025-01-18 DIAGNOSIS — J45.909 UNCOMPLICATED ASTHMA, UNSPECIFIED ASTHMA SEVERITY, UNSPECIFIED WHETHER PERSISTENT (HHS-HCC): ICD-10-CM

## 2025-01-20 RX ORDER — ALBUTEROL SULFATE 90 UG/1
INHALANT RESPIRATORY (INHALATION)
Qty: 18 G | Refills: 2 | Status: SHIPPED | OUTPATIENT
Start: 2025-01-20

## 2025-03-10 ENCOUNTER — DOCUMENTATION (OUTPATIENT)
Dept: PHYSICAL THERAPY | Facility: CLINIC | Age: 45
End: 2025-03-10
Payer: MEDICAID

## 2025-03-10 NOTE — PROGRESS NOTES
Physical Therapy    Discharge Summary    Name: Yessica Melgar  MRN: [id number]  : 1980  Date: 03/10/25    Discharge Summary: PT    Discharge Information: Date of discharge 3/10/25, Date of last visit 24, Date of evaluation 24, Number of attended visits s5, Referred by Chela Saravia, and Referred for PTT    Therapy Summary: Patient seen in PT for 5 visits for PTT.  Patient did not follow up in PT after her 5th PT visits.      Discharge Status: Status unknonw     Rehab Discharge Reason: Failed to schedule and/or keep follow-up appointment(s)

## 2025-05-19 ENCOUNTER — TELEPHONE (OUTPATIENT)
Facility: CLINIC | Age: 45
End: 2025-05-19
Payer: MEDICAID

## 2025-05-19 DIAGNOSIS — N89.8 VAGINAL DISCHARGE: Primary | ICD-10-CM

## 2025-05-19 RX ORDER — METRONIDAZOLE 7.5 MG/G
GEL VAGINAL DAILY
Qty: 70 G | Refills: 2 | Status: SHIPPED | OUTPATIENT
Start: 2025-05-19 | End: 2025-05-24

## 2025-05-19 RX ORDER — FLUCONAZOLE 150 MG/1
150 TABLET ORAL ONCE
Qty: 2 TABLET | Refills: 1 | Status: SHIPPED | OUTPATIENT
Start: 2025-05-19 | End: 2025-05-19

## 2025-05-19 NOTE — TELEPHONE ENCOUNTER
Talked to the patient.  She just finished her menstrual cycle.  Her symptoms are actually more related to BV she claims thin discharge and odor.  I did recommend metronidazole gel for BV symptoms with refills to use as needed for recurrence.  She does intermittently have itchiness, and I did also refilled fluconazole.

## 2025-06-27 DIAGNOSIS — J45.909 UNCOMPLICATED ASTHMA, UNSPECIFIED ASTHMA SEVERITY, UNSPECIFIED WHETHER PERSISTENT (HHS-HCC): ICD-10-CM

## 2025-06-30 RX ORDER — ALBUTEROL SULFATE 90 UG/1
INHALANT RESPIRATORY (INHALATION)
Qty: 18 G | Refills: 2 | Status: SHIPPED | OUTPATIENT
Start: 2025-06-30

## 2025-09-09 ENCOUNTER — APPOINTMENT (OUTPATIENT)
Dept: OBSTETRICS AND GYNECOLOGY | Facility: CLINIC | Age: 45
End: 2025-09-09
Payer: MEDICAID